# Patient Record
(demographics unavailable — no encounter records)

---

## 2025-03-28 NOTE — ADDENDUM
[FreeTextEntry1] :  scribe attestation; I, Jian Wilkes, am scribing for and in the presence of Dr. Michael Meng in the following sections HISTORY OF PRESENT ILLNESS, PAST MEDICAL/FAMILY/SOCIAL HISTORY; REVIEW OF SYSTEMS; VITAL SIGNS; PHYSICAL EXAM; PROCEDURES; DISCUSSION.   I, Dr. Michael Meng, personally performed the services described in the documentation, reviewed the documentation recorded by the scribe in my presence, and it accurately and completely records my words and actions.

## 2025-03-28 NOTE — PROCEDURE
[Image(s) Captured] : image(s) captured and filed [Video Captured] : video captured and filed [Topical Lidocaine] : topical lidocaine [Oxymetazoline HCl] : oxymetazoline HCl [Flexible Endoscope] : examined with the flexible endoscope [Serial Number: ___] : Serial Number: [unfilled] [Unable to Cooperate with Mirror] : patient unable to cooperate with mirror [Complicated Symptoms] : complicated symptoms requiring more thorough examination than provided by mirror [Normal] : posterior cricoid area had healthy pink mucosa in the interarytenoid area and the esophageal inlet [de-identified] : Patient was placed in the examination chair in a sitting position. The nose was decongested with oxymetazoline nasal solution. The airway was anesthetized with 4% Xylocaine.  The back of the throat was anesthetized with Cetacaine. Direct flexible/rigid video endoscopy was performed. Findings revealed: deviated septum to the left, turbinate hypertrophy, fracture line on the right. Positive Cr maneuver, thick base of tongue. Larynx, epiglottis and vocal cords are normal.  [FreeTextEntry4] : + Cr maneuver [de-identified] : thick

## 2025-03-28 NOTE — REASON FOR VISIT
[Initial Consultation] : an initial consultation for [FreeTextEntry2] : referred by Dr. Nabeel Stover, ENT, for obstructive sleep apnea

## 2025-03-28 NOTE — CONSULT LETTER
[Dear  ___] : Dear  [unfilled], [Consult Letter:] : I had the pleasure of evaluating your patient, [unfilled]. [Please see my note below.] : Please see my note below. [Consult Closing:] : Thank you very much for allowing me to participate in the care of this patient.  If you have any questions, please do not hesitate to contact me. [Sincerely,] : Sincerely, [FreeTextEntry2] : Nabeel Stover [FreeTextEntry3] : Michael Meng MD, JAZMÍN, FACS  Department Otolaryngology Director of [facility] Professor of Otolaryngology,  [employer]/[facility] [employer] [facility]

## 2025-03-28 NOTE — PHYSICAL EXAM
[FreeTextEntry1] : MERCEDES [] : septum deviated to the left [de-identified] : hypertrophy. Fracutre line on the right.  [Midline] : trachea located in midline position [de-identified] : 3+ tonsils, almost midline.  [Normal] : no rashes

## 2025-03-28 NOTE — HISTORY OF PRESENT ILLNESS
[de-identified] : 54 year old male referred by Dr. Nabeel Stover, ENT, for obstructive sleep apnea.  PSG 2/1/25 AHI 12.7.  States has not yet tried CPAP.     [Snoring] : snoring [Witnessed Apneas] : witnessed sleep apnea [Frequent Nocturnal Awakening] : frequent nocturnal awakening [Daytime Somnolence] : daytime somnolence [Unintentional Sleep while Active] : no unintentional sleep while active [Unintentional Sleep While Inactive] : unintentional sleep while inactive [Awakes Unrefreshed] : does not awake unrefreshed [Awakes with Headache] : no headache upon awakening [Awakening With Dry Mouth] : awakening with dry mouth [Recent  Weight Gain] : no recent weight gain

## 2025-05-28 NOTE — HISTORY OF PRESENT ILLNESS
[de-identified] : 05/27/2025: 53 y/o male presenting today with right thigh pain. Was playing tennis on 5/25/25- raced from [employer] to catch drop shot, felt something "give"/pop. Experiencing bruising over medial thigh.   Works in finance  PMH: High cholesterol Meds: Atorvastatin, testosterone  NKDA

## 2025-05-28 NOTE — IMAGING
[de-identified] : Right leg Pain with flexion and adduction Skin intact NVID ecchymosis and swelling medial thigh TTP proximal medial thigh  Pelvis x-rays 1 view taken today in office demonstrate no acute changes.  Yes

## 2025-05-28 NOTE — ASSESSMENT
[FreeTextEntry1] : The patient was advised of the diagnosis. The natural history of the pathology was explained in full to the patient in layman's terms. All questions were answered. The risks and benefits of surgical and non-surgical treatment alternatives were explained in full to the patient.  PT rx provided to patient Right hip MRI rx to r/o tears and for surgical planning  Discussed protected weightbearing- patient may use cane/crutch as needed  naproxen rx; NSAIDs recommended.  Patient warned of risk of NSAID medication to stomach and GI tract, risk of increase blood pressure, cardiac risk, and risk of fluid retention.  The patient should clear taking medication with internist/PMD if any problem with heart, blood pressure, or GI system exists.   RTO after MRI

## 2025-05-28 NOTE — IMAGING
[de-identified] : Right leg Pain with flexion and adduction Skin intact NVID ecchymosis and swelling medial thigh TTP proximal medial thigh  Pelvis x-rays 1 view taken today in office demonstrate no acute changes.

## 2025-05-28 NOTE — HISTORY OF PRESENT ILLNESS
[de-identified] : 05/27/2025: 55 y/o male presenting today with right thigh pain. Was playing tennis on 5/25/25- raced from [employer] to catch drop shot, felt something "give"/pop. Experiencing bruising over medial thigh.   Works in finance  PMH: High cholesterol Meds: Atorvastatin, testosterone  NKDA

## 2025-05-29 NOTE — HISTORY OF PRESENT ILLNESS
[de-identified] : 05/29/2025: Patient here today s/p right hip MRI. Images reviewed by me today. My independent interpretation of this test is full thickness avulsion tear of the proximal adductor longus tendon.   05/27/2025: 53 y/o male presenting today with right thigh pain. Was playing tennis on 5/25/25- raced from [employer] to catch drop shot, felt something "give"/pop. Experiencing bruising over medial thigh.   Works in finance  PMH: High cholesterol Meds: Atorvastatin, testosterone  NKDA

## 2025-05-29 NOTE — IMAGING
[de-identified] : Right leg Pain with flexion and adduction Skin intact NVID ecchymosis and swelling medial thigh TTP proximal medial thigh

## 2025-05-29 NOTE — ASSESSMENT
[FreeTextEntry1] : The patient was advised of the diagnosis. The natural history of the pathology was explained in full to the patient in layman's terms. All questions were answered. The risks and benefits of surgical and non-surgical treatment alternatives were explained in full to the patient.  MRI reviewed.  Continue to attend PT.   Discussed protected weightbearing- patient may use cane/crutch as needed  NSAIDs recommended.  Patient warned of risk of NSAID medication to stomach and GI tract, risk of increase blood pressure, cardiac risk, and risk of fluid retention.  The patient should clear taking medication with internist/PMD if any problem with heart, blood pressure, or GI system exists.   RTO PRN